# Patient Record
Sex: FEMALE | Race: OTHER | URBAN - METROPOLITAN AREA
[De-identification: names, ages, dates, MRNs, and addresses within clinical notes are randomized per-mention and may not be internally consistent; named-entity substitution may affect disease eponyms.]

---

## 2018-06-14 ENCOUNTER — APPOINTMENT (OUTPATIENT)
Dept: OTHER | Facility: HOSPITAL | Age: 58
End: 2018-06-14
Attending: EMERGENCY MEDICINE

## 2024-08-21 ENCOUNTER — OFFICE VISIT (OUTPATIENT)
Dept: OBGYN CLINIC | Facility: CLINIC | Age: 64
End: 2024-08-21

## 2024-08-21 VITALS
WEIGHT: 245 LBS | DIASTOLIC BLOOD PRESSURE: 81 MMHG | BODY MASS INDEX: 43.41 KG/M2 | HEIGHT: 63 IN | SYSTOLIC BLOOD PRESSURE: 128 MMHG | HEART RATE: 101 BPM

## 2024-08-21 DIAGNOSIS — N95.0 POSTMENOPAUSE BLEEDING: Primary | ICD-10-CM

## 2024-08-21 RX ORDER — METRONIDAZOLE 500 MG/1
TABLET ORAL
COMMUNITY
Start: 2024-08-10

## 2024-08-21 RX ORDER — MULTIVITAMIN
1 TABLET ORAL DAILY
COMMUNITY
Start: 2024-08-10

## 2024-08-21 RX ORDER — VALSARTAN AND HYDROCHLOROTHIAZIDE 320; 12.5 MG/1; MG/1
1 TABLET, FILM COATED ORAL DAILY
COMMUNITY
Start: 2024-08-10

## 2024-08-21 RX ORDER — METFORMIN HCL 500 MG
TABLET, EXTENDED RELEASE 24 HR ORAL
COMMUNITY
Start: 2024-08-13

## 2024-08-21 NOTE — PROGRESS NOTES
Irma Garcia is a 64 year old female  No LMP recorded. (Menstrual status: Menopause).   Chief Complaint   Patient presents with    Gyn Exam     Pt c/o spotting that started in march .      Menopause, and recently in march spotting, and monthly spotting since then. Ultrasound  nornal uterus, 5mm, 1.1 cm fibroid.   OBSTETRICS HISTORY:     OB History    Para Term  AB Living   0 0 0 0 0 0   SAB IAB Ectopic Multiple Live Births   0 0 0 0 0       GYNE HISTORY:     Hx Prior Abnormal Pap: No  Pap Date: 23   Use of Birth Control (if yes, specify type): Postmenopausal (2024  8:49 AM)  Hx Prior Abnormal Pap: No (2024  8:49 AM)  Pap Date: 23 (2024  8:49 AM)         No data to display                  MEDICAL HISTORY:     Past Medical History:    Extrinsic asthma, unspecified    Sinus problem    Spontaneous  (HCC)    Unspecified sleep apnea       SURGICAL HISTORY:     Past Surgical History:   Procedure Laterality Date    Tonsillectomy         SOCIAL HISTORY:     Social History     Socioeconomic History    Marital status: Single   Tobacco Use    Smoking status: Never     Passive exposure: Never   Substance and Sexual Activity    Alcohol use: No     Alcohol/week: 0.0 standard drinks of alcohol    Drug use: No   Other Topics Concern    Caffeine Concern No     Social Determinants of Health     Financial Resource Strain: Low Risk  (2023)    Received from [facility]    Overall Financial Resource Strain (CARDIA)     Difficulty of Paying Living Expenses: Not very hard   Food Insecurity: No Food Insecurity (2023)    Received from [facility]    Food Insecurity     Within the past 30 days, I worried whether my food would run out before I got money to buy more. / En los últimos 30 días, me preocupó que la comida se podía acabar antes de tener dinero para compr...: Never true / Nunca     Within the past 30 days, the food that I  bought just didn't last, and I didn't have money to get more. / En los últimos 30 días, La comida que compré no rindió lo suficiente, y no tenía dinero para...: Never true / Nunca   Housing Stability: Low Risk  (4/6/2023)    Received from [facility]    Housing Stability Vital Sign     Unable to Pay for Housing in the Last Year: No     Number of Places Lived in the Last Year: 1     Unstable Housing in the Last Year: No        FAMILY HISTORY:     Family History   Problem Relation Age of Onset    Cancer Mother         Female Cancer     Thyroid disease Maternal Aunt         Hyperthyroidism     Diabetes Father        MEDICATIONS:       Current Outpatient Medications:     metFORMIN  MG Oral Tablet 24 Hr, TAKE 1 TABLET BY MOUTH TWICE DAILY TO HELP KEEP SUGAR UNDER 200, Disp: , Rfl:     metRONIDAZOLE 0.75 % External Cream, , Disp: , Rfl:     metRONIDAZOLE 500 MG Oral Tab, TAKE 1 TABLET BY MOUTH TWICE DAILY FOR ITCHING, Disp: , Rfl:     Multiple Vitamin (MULTIVITAMIN) Oral Tab, Take 1 tablet by mouth daily., Disp: , Rfl:     Valsartan-hydroCHLOROthiazide 320-12.5 MG Oral Tab, Take 1 tablet by mouth daily., Disp: , Rfl:     Fluticasone Propionate (FLONASE) 50 MCG/ACT Nasal Suspension, 2 sprays by Nasal route daily., Disp: , Rfl:     Albuterol Sulfate HFA (PROAIR HFA) 108 (90 BASE) MCG/ACT Inhalation Aero Soln, INHALE 2 TIMES BY MOUTH FOUR TIMES DAILY AS NEEDED, Disp: 1 Inhaler, Rfl: 3    Budesonide-Formoterol Fumarate (SYMBICORT) 80-4.5 MCG/ACT Inhalation Aerosol, Inhale 2 puffs into the lungs 2 (two) times daily., Disp: 1 Inhaler, Rfl: 6    ALLERGIES:       Allergies   Allergen Reactions    Clindamycin ANAPHYLAXIS         REVIEW OF SYSTEMS:     Constitutional:    denies fever / chills  Eyes:     denies blurred or double vision  Cardiovascular:  denies chest pain or palpitations  Respiratory:    denies shortness of breath  Gastrointestinal:  denies severe abdominal pain, frequent diarrhea or  constipation, nausea / vomiting  Genitourinary:    denies dysuria, bothersome incontinence  Skin/Breast:   denies any breast pain, lumps, or discharge  Neurological:    denies frequent severe headaches  Psychiatric:   denies depression or anxiety, thoughts of harming self or others  Heme/Lymph:    denies easy bruising or bleeding      PHYSICAL EXAM:   Blood pressure 128/81, pulse 101, height 5' 3\" (1.6 m), weight 245 lb (111.1 kg), not currently breastfeeding.  Constitutional:  well developed, well nourished  Head/Face:  normocephalic  Neck/Thyroid: thyroid symmetric, no thyromegaly, no nodules, no adenopathy  Lymphatic: no abnormal supraclavicular or axillary adenopathy is noted  Respiratory:      chest wall symmetric and nontender on palpation, clear to asculation bilateral, no wheezing, rales, ronchi, and resonance normal upon percussion  Cardiovascular: chest normal in appearance, regular rate and rhythm, no murmurs, PMI palpated midclavicular line  Breast:   normal bilaterally without palpable masses, tenderness, asymmetry, nipple discharge, nipple retraction or skin changes bilaterally  Abdomen:   soft, nontender, nondistended, no masses  Skin/Hair:  no unusual rashes or bruises  Extremities:  no edema, no cyanosis, non tender bilaterally  Psychiatric:   oriented to time, place, person and situation. Appropriate mood and affect    Pelvic Exam:  External Genitalia:  normal appearance, hair distribution, and no lesions  Urethral Meatus:   normal in size, location, without lesions , atrophic vaginal tissue  Bladder:    no fullness, masses or tenderness  Vagina:    normal appearance without lesions, no abnormal discharge, atrophic appearing  Cervix:    No lesions, normal friability   Uterus:    normal in size, 8 wk sized, normal contour, position, mobility, without  motion tenderness  Adnexa:   normal without masses or tenderness  Perineum:   normal  Anus: no hemorroids         ASSESSMENT & PLAN:     Irma was  seen today for gyn exam.    Diagnoses and all orders for this visit:    Postmenopause bleeding  I reviewed the ultrasound with the patient from July 5, 2024.  Endometrial thickness is 5 mm.  Normal is 4 mm or less.  The patient states that she noticed the bleeding only after wiping after urination on the paper.  She had no bleeding on the pad liner that she normally wears.  During speculum exam today the area around the urethra was very easy to bleed due to the atrophy.  There was no bleeding noted in the vagina or around the cervix on speculum exam.  I believe that the spotting on the paper is from atrophy periurethrally.  I do not think the bleeding came from the uterus.  Due to cervical stenosis and the fact that I did not see any vaginal bleeding I do not think she needs a hysteroscopy D&C.  I was unable to do the an endometrial biopsy today due to cervical stenosis but clinically I do not feel that she needs a hysteroscopy.  She was asked to return to my office if she has any future bleeding so that I can repeat the speculum exam to make sure is not coming from the uterus.  The patient was reassured.        FOLLOW-UP     No follow-ups on file.      Dmitriy Anguiano MD  8/21/2024

## (undated) NOTE — LETTER
AUTHORIZATION FOR SURGICAL OPERATION OR OTHER PROCEDURE    1. I hereby authorize Dr. Anguiano, and [facility] staff assigned to my case to perform the following operation and/or procedure at the [facility] site:    Endometrial Biopsy    2.  My physician has explained the nature and purpose of the operation or other procedure, possible alternative methods of treatment, the risks involved, and the possibility of complication to me.  I acknowledge that no guarantee has been made as to the result that may be obtained.  3.  I recognize that, during the course of this operation, or other procedure, unforseen conditions may necessitate additional or different procedure than those listed above.  I, therefore, further authorize and request that the above named physician, his/her physician assistants or designees perform such procedures as are, in his/her professional opinion, necessary and desirable.  4.  Any tissue or organs removed in the operation or other procedure may be disposed of by and at the discretion of the [facility] and [facility].  5.  I understand that in the event of a medical emergency, I will be transported by local paramedics to [facility] or other hospital emergency department.  6.  I certify that I have read and fully understand the above consent to operation and/or other procedure.    7.  I acknowledge that my physician has explained sedation/analgesia administration to me including the risks and benefits.  I consent to the administration of sedation/analgesia as may be necessary or desirable in the judgement of my physician.    Witness signature: ___________________________________________________ Date:  ______/______/_____                    Time:  ________ A.M.  P.M.       Patient Name:  ______________________________________________________  (please print)      Patient signature:   ___________________________________________________             Relationship to Patient:           []  Parent    Responsible person                          []  Spouse  In case of minor or                    [] Other  _____________   Incompetent name:  __________________________________________________                               (please print)      _____________      Responsible person  In case of minor or  Incompetent signature:  _______________________________________________    Statement of Physician  My signature below affirms that prior to the time of the procedure, I have explained to the patient and/or his/her guardian, the risks and benefits involved in the proposed treatment and any reasonable alternative to the proposed treatment.  I have also explained the risks and benefits involved in the refusal of the proposed treatment and have answered the patient's questions.                        Date:  ______/______/_______  Provider                      Signature:  __________________________________________________________       Time:  ___________ A.M    P.M.